# Patient Record
Sex: MALE | Race: BLACK OR AFRICAN AMERICAN | NOT HISPANIC OR LATINO | Employment: UNEMPLOYED | URBAN - METROPOLITAN AREA
[De-identification: names, ages, dates, MRNs, and addresses within clinical notes are randomized per-mention and may not be internally consistent; named-entity substitution may affect disease eponyms.]

---

## 2017-08-14 ENCOUNTER — HOSPITAL ENCOUNTER (EMERGENCY)
Facility: HOSPITAL | Age: 22
Discharge: HOME | End: 2017-08-14
Attending: EMERGENCY MEDICINE | Admitting: EMERGENCY MEDICINE
Payer: COMMERCIAL

## 2017-08-14 VITALS
HEART RATE: 52 BPM | DIASTOLIC BLOOD PRESSURE: 76 MMHG | RESPIRATION RATE: 16 BRPM | OXYGEN SATURATION: 96 % | SYSTOLIC BLOOD PRESSURE: 134 MMHG | TEMPERATURE: 98.3 F

## 2017-08-14 DIAGNOSIS — Z76.89 ENCOUNTER FOR PSYCHIATRIC ASSESSMENT: Primary | ICD-10-CM

## 2017-08-14 LAB — ETHANOL EXG-MCNC: 0 MG/DL

## 2017-08-14 PROCEDURE — 82075 ASSAY OF BREATH ETHANOL: CPT | Performed by: EMERGENCY MEDICINE

## 2017-08-14 PROCEDURE — 99284 EMERGENCY DEPT VISIT MOD MDM: CPT

## 2018-01-09 ENCOUNTER — HOSPITAL ENCOUNTER (EMERGENCY)
Facility: HOSPITAL | Age: 23
Discharge: HOME | End: 2018-01-09
Attending: EMERGENCY MEDICINE | Admitting: EMERGENCY MEDICINE
Payer: COMMERCIAL

## 2018-01-09 VITALS
HEART RATE: 92 BPM | SYSTOLIC BLOOD PRESSURE: 131 MMHG | OXYGEN SATURATION: 98 % | RESPIRATION RATE: 16 BRPM | TEMPERATURE: 97.8 F | WEIGHT: 200 LBS | DIASTOLIC BLOOD PRESSURE: 61 MMHG

## 2018-01-09 DIAGNOSIS — R11.2 NAUSEA VOMITING AND DIARRHEA: ICD-10-CM

## 2018-01-09 DIAGNOSIS — R55 VASOVAGAL SYNCOPE: Primary | ICD-10-CM

## 2018-01-09 DIAGNOSIS — S01.112A EYEBROW LACERATION, LEFT, INITIAL ENCOUNTER: ICD-10-CM

## 2018-01-09 DIAGNOSIS — E86.0 DEHYDRATION: ICD-10-CM

## 2018-01-09 DIAGNOSIS — R19.7 NAUSEA VOMITING AND DIARRHEA: ICD-10-CM

## 2018-01-09 LAB
ALBUMIN SERPL BCP-MCNC: 4 G/DL (ref 3.5–5)
ALP SERPL-CCNC: 106 U/L (ref 46–116)
ALT SERPL W P-5'-P-CCNC: 22 U/L (ref 12–78)
ANION GAP SERPL CALCULATED.3IONS-SCNC: 7 MMOL/L (ref 4–13)
AST SERPL W P-5'-P-CCNC: 13 U/L (ref 5–45)
ATRIAL RATE: 79 BPM
BASOPHILS # BLD MANUAL: 0.14 THOUSAND/UL (ref 0–0.1)
BASOPHILS NFR MAR MANUAL: 1 % (ref 0–1)
BILIRUB SERPL-MCNC: 0.5 MG/DL (ref 0.2–1)
BUN SERPL-MCNC: 11 MG/DL (ref 5–25)
CALCIUM SERPL-MCNC: 9.4 MG/DL (ref 8.3–10.1)
CHLORIDE SERPL-SCNC: 105 MMOL/L (ref 100–108)
CO2 SERPL-SCNC: 29 MMOL/L (ref 21–32)
CREAT SERPL-MCNC: 1.22 MG/DL (ref 0.6–1.3)
EOSINOPHIL # BLD MANUAL: 0.14 THOUSAND/UL (ref 0–0.4)
EOSINOPHIL NFR BLD MANUAL: 1 % (ref 0–6)
ERYTHROCYTE [DISTWIDTH] IN BLOOD BY AUTOMATED COUNT: 12.6 % (ref 11.6–15.1)
GFR SERPL CREATININE-BSD FRML MDRD: 97 ML/MIN/1.73SQ M
GLUCOSE SERPL-MCNC: 114 MG/DL (ref 65–140)
HCT VFR BLD AUTO: 45 % (ref 36.5–49.3)
HGB BLD-MCNC: 15.7 G/DL (ref 12–17)
LYMPHOCYTES # BLD AUTO: 0.28 THOUSAND/UL (ref 0.6–4.47)
LYMPHOCYTES # BLD AUTO: 2 % (ref 14–44)
MCH RBC QN AUTO: 29.7 PG (ref 26.8–34.3)
MCHC RBC AUTO-ENTMCNC: 34.9 G/DL (ref 31.4–37.4)
MCV RBC AUTO: 85 FL (ref 82–98)
MONOCYTES # BLD AUTO: 0.57 THOUSAND/UL (ref 0–1.22)
MONOCYTES NFR BLD: 4 % (ref 4–12)
NEUTROPHILS # BLD MANUAL: 13.1 THOUSAND/UL (ref 1.85–7.62)
NEUTS BAND NFR BLD MANUAL: 6 % (ref 0–8)
NEUTS SEG NFR BLD AUTO: 86 % (ref 43–75)
NRBC BLD AUTO-RTO: 0 /100 WBCS
P AXIS: 52 DEGREES
PLATELET # BLD AUTO: 199 THOUSANDS/UL (ref 149–390)
PLATELET BLD QL SMEAR: ADEQUATE
PMV BLD AUTO: 10 FL (ref 8.9–12.7)
POTASSIUM SERPL-SCNC: 4 MMOL/L (ref 3.5–5.3)
PR INTERVAL: 174 MS
PROT SERPL-MCNC: 7.7 G/DL (ref 6.4–8.2)
QRS AXIS: 91 DEGREES
QRSD INTERVAL: 90 MS
QT INTERVAL: 334 MS
QTC INTERVAL: 382 MS
RBC # BLD AUTO: 5.28 MILLION/UL (ref 3.88–5.62)
RBC MORPH BLD: NORMAL
SODIUM SERPL-SCNC: 141 MMOL/L (ref 136–145)
T WAVE AXIS: 42 DEGREES
TOTAL CELLS COUNTED SPEC: 100
VENTRICULAR RATE: 79 BPM
WBC # BLD AUTO: 14.24 THOUSAND/UL (ref 4.31–10.16)

## 2018-01-09 PROCEDURE — 85027 COMPLETE CBC AUTOMATED: CPT | Performed by: EMERGENCY MEDICINE

## 2018-01-09 PROCEDURE — 96361 HYDRATE IV INFUSION ADD-ON: CPT

## 2018-01-09 PROCEDURE — 80053 COMPREHEN METABOLIC PANEL: CPT | Performed by: EMERGENCY MEDICINE

## 2018-01-09 PROCEDURE — 96374 THER/PROPH/DIAG INJ IV PUSH: CPT

## 2018-01-09 PROCEDURE — 85007 BL SMEAR W/DIFF WBC COUNT: CPT | Performed by: EMERGENCY MEDICINE

## 2018-01-09 PROCEDURE — 93005 ELECTROCARDIOGRAM TRACING: CPT

## 2018-01-09 PROCEDURE — 99284 EMERGENCY DEPT VISIT MOD MDM: CPT

## 2018-01-09 PROCEDURE — 36415 COLL VENOUS BLD VENIPUNCTURE: CPT | Performed by: EMERGENCY MEDICINE

## 2018-01-09 RX ORDER — LIDOCAINE HYDROCHLORIDE 20 MG/ML
5 INJECTION, SOLUTION INFILTRATION; PERINEURAL ONCE
Status: COMPLETED | OUTPATIENT
Start: 2018-01-09 | End: 2018-01-09

## 2018-01-09 RX ORDER — ONDANSETRON 4 MG/1
4 TABLET, ORALLY DISINTEGRATING ORAL EVERY 6 HOURS PRN
Qty: 20 TABLET | Refills: 0 | Status: SHIPPED | OUTPATIENT
Start: 2018-01-09 | End: 2018-01-14

## 2018-01-09 RX ORDER — ONDANSETRON 2 MG/ML
4 INJECTION INTRAMUSCULAR; INTRAVENOUS ONCE
Status: COMPLETED | OUTPATIENT
Start: 2018-01-09 | End: 2018-01-09

## 2018-01-09 RX ADMIN — SODIUM CHLORIDE 1000 ML: 0.9 INJECTION, SOLUTION INTRAVENOUS at 18:25

## 2018-01-09 RX ADMIN — ONDANSETRON 4 MG: 2 INJECTION INTRAMUSCULAR; INTRAVENOUS at 17:39

## 2018-01-09 RX ADMIN — SODIUM CHLORIDE 1000 ML: 0.9 INJECTION, SOLUTION INTRAVENOUS at 17:39

## 2018-01-09 RX ADMIN — LIDOCAINE HYDROCHLORIDE 5 ML: 20 INJECTION, SOLUTION INFILTRATION; PERINEURAL at 17:39

## 2018-01-09 NOTE — ED PROVIDER NOTES
History  Chief Complaint   Patient presents with    Syncope     patient states that he was in the bathroom and "over-heated and passed out" while vomiting; patient states that he hit his head at on the floor; small laceration noted above left eye;     Head Laceration    Vomiting     50-year-old male presents for evaluation of a syncopal event that occurred earlier today while he was on the toilet  The patient states that he has had multiple episodes of vomiting and diarrhea throughout the day  He was sitting on the toilet having a bowel movement when he became hot, flushed and then fell forward passed out striking the left supraorbital region causing a laceration  At the time of examination in the emergency department, the patient denies any chest pain, pressure, shortness of breath  He states that he did not have any of these preceding the episode  Patient has no history of exercise induced syncope  There is no family history of sudden cardiac death  History provided by:  Patient  Syncope   Episode history:  Single  Most recent episode: Today  Timing:  Constant  Progression:  Resolved  Chronicity:  New  Context: bowel movement    Witnessed: no    Relieved by:  Nothing  Worsened by:  Nothing  Ineffective treatments:  None tried  Associated symptoms: malaise/fatigue, nausea and vomiting    Associated symptoms: no chest pain, no difficulty breathing, no dizziness, no fever, no headaches, no palpitations and no shortness of breath    Head Laceration   Associated symptoms: no fever    Vomiting   Associated symptoms: diarrhea    Associated symptoms: no chills, no fever and no headaches        None       Past Medical History:   Diagnosis Date    Cardiac disease     heart murmur       History reviewed  No pertinent surgical history  History reviewed  No pertinent family history  I have reviewed and agree with the history as documented      Social History   Substance Use Topics    Smoking status: Current Every Day Smoker    Smokeless tobacco: Never Used    Alcohol use No        Review of Systems   Constitutional: Positive for malaise/fatigue  Negative for chills and fever  Respiratory: Negative for shortness of breath  Cardiovascular: Positive for syncope  Negative for chest pain and palpitations  Gastrointestinal: Positive for diarrhea, nausea and vomiting  Neurological: Positive for light-headedness  Negative for dizziness and headaches  All other systems reviewed and are negative  Physical Exam  ED Triage Vitals [01/09/18 1647]   Temperature Pulse Respirations Blood Pressure SpO2   97 8 °F (36 6 °C) 85 20 112/71 100 %      Temp Source Heart Rate Source Patient Position - Orthostatic VS BP Location FiO2 (%)   Temporal Monitor Sitting Right arm --      Pain Score       --           Orthostatic Vital Signs  Vitals:    01/09/18 1647   BP: 112/71   Pulse: 85   Patient Position - Orthostatic VS: Sitting       Physical Exam   Constitutional: He is oriented to person, place, and time  He appears well-developed and well-nourished  No distress  HENT:   Head: Normocephalic  Head is with laceration  Head is without raccoon's eyes and without Godoy's sign  Right Ear: External ear normal  No hemotympanum  Left Ear: External ear normal  No hemotympanum  Nose: No nasal deformity or nasal septal hematoma  Mouth/Throat: Mucous membranes are dry  Eyes: Conjunctivae and EOM are normal  Pupils are equal, round, and reactive to light  Neck: Normal range of motion  No tracheal deviation present  Cardiovascular: Normal rate and regular rhythm  Murmur heard  Pulmonary/Chest: Effort normal and breath sounds normal  No respiratory distress  He exhibits no tenderness  Abdominal: Soft  He exhibits no distension  There is no tenderness  There is no rebound and no guarding  Musculoskeletal: Normal range of motion  He exhibits no tenderness     Neurological: He is alert and oriented to person, place, and time  He has normal reflexes  Skin: Skin is warm and dry  Psychiatric: He has a normal mood and affect  Nursing note and vitals reviewed  ED Medications  Medications   sodium chloride 0 9 % bolus 1,000 mL (1,000 mL Intravenous New Bag 1/9/18 1739)   sodium chloride 0 9 % bolus 1,000 mL (not administered)   ondansetron (ZOFRAN) injection 4 mg (4 mg Intravenous Given 1/9/18 1739)   lidocaine (XYLOCAINE) 2 % injection 5 mL (5 mL Infiltration Given 1/9/18 1739)       Diagnostic Studies  Results Reviewed     Procedure Component Value Units Date/Time    CBC and differential [[id number]]  (Abnormal) Collected:  01/09/18 1738    Lab Status:  Final result Specimen:  Blood from Arm, Left Updated:  01/09/18 1816     WBC 14 24 (H) Thousand/uL      RBC 5 28 Million/uL      Hemoglobin 15 7 g/dL      Hematocrit 45 0 %      MCV 85 fL      MCH 29 7 pg      MCHC 34 9 g/dL      RDW 12 6 %      MPV 10 0 fL      Platelets 757 Thousands/uL      nRBC 0 /100 WBCs     Narrative: This is an appended report  These results have been appended to a previously verified report  Comprehensive metabolic panel [[id number]] Collected:  01/09/18 1738    Lab Status:  Final result Specimen:  Blood from Arm, Left Updated:  01/09/18 1809     Sodium 141 mmol/L      Potassium 4 0 mmol/L      Chloride 105 mmol/L      CO2 29 mmol/L      Anion Gap 7 mmol/L      BUN 11 mg/dL      Creatinine 1 22 mg/dL      Glucose 114 mg/dL      Calcium 9 4 mg/dL      AST 13 U/L      ALT 22 U/L      Alkaline Phosphatase 106 U/L      Total Protein 7 7 g/dL      Albumin 4 0 g/dL      Total Bilirubin 0 50 mg/dL      eGFR 97 ml/min/1 73sq m     Narrative:         National Kid[employer] Disease Education Program recommendations are as follows:  GFR calculation is accurate only with a steady state creatinine  Chronic Kidney disease less than 60 ml/min/1 73 sq  meters  Kidney failure less than 15 ml/min/1 73 sq  meters                   No orders to display Procedures  ECG 12 Lead Documentation  Date/Time: 1/9/2018 5:30 PM  Performed by: Keny Davis  Authorized by: Arvin ODEN     Indications / Diagnosis:  Syncope  ECG reviewed by me, the ED Provider: yes    Patient location:  ED  Previous ECG:     Previous ECG:  Compared to current    Comparison ECG info:  Sinus arrhythmia no longer present when compared to previous EKG    Similarity:  No change  Interpretation:     Interpretation: normal    Rate:     ECG rate:  79    ECG rate assessment: normal    Rhythm:     Rhythm: sinus rhythm    Ectopy:     Ectopy: none    QRS:     QRS axis:  Normal    QRS intervals:  Normal  Conduction:     Conduction: normal    ST segments:     ST segments:  Normal  T waves:     T waves: normal    Comments:      QTC normal    Lac Repair  Date/Time: 1/9/2018 6:10 PM  Performed by: Keny Davis  Authorized by: Keny Davis   Consent: Verbal consent obtained  Risks and benefits: risks, benefits and alternatives were discussed  Consent given by: patient  Patient understanding: patient states understanding of the procedure being performed  Body area: head/neck  Location details: left eyelid  Laceration length: 2 5 cm  Tendon involvement: none  Nerve involvement: none  Vascular damage: no  Anesthesia: local infiltration    Anesthesia:  Local Anesthetic: lidocaine 2% without epinephrine  Anesthetic total: 6 mL      Procedure Details:  Preparation: Patient was prepped and draped in the usual sterile fashion    Irrigation solution: saline  Irrigation method: syringe  Amount of cleaning: standard  Skin closure: 5-0 nylon (5)  Subcutaneous closure: 5-0 Vicryl (2)  Technique: simple  Approximation: close  Patient tolerance: Patient tolerated the procedure well with no immediate complications             Phone Contacts  ED Phone Contact    ED Course  ED Course                                MDM  Number of Diagnoses or Management Options  Dehydration: new and requires workup  Eyebrow laceration, left, initial encounter: new and requires workup  Nausea vomiting and diarrhea: new and requires workup  Vasovagal syncope: new and requires workup     Amount and/or Complexity of Data Reviewed  Clinical lab tests: ordered and reviewed  Tests in the medicine section of CPT®: reviewed and ordered  Review and summarize past medical records: yes  Independent visualization of images, tracings, or specimens: yes      CritCare Time    Disposition  Final diagnoses:   Vasovagal syncope   Nausea vomiting and diarrhea   Eyebrow laceration, left, initial encounter   Dehydration     Time reflects when diagnosis was documented in both MDM as applicable and the Disposition within this note     Time User Action Codes Description Comment    1/9/2018  5:22 PM Baird Posada Add [R55] Vasovagal syncope     1/9/2018  5:22 PM Deisy Posada Add [R11 2,  R19 7] Nausea vomiting and diarrhea     1/9/2018  5:22 PM Deisy Posada Modify [R11 2,  R19 7] Nausea vomiting and diarrhea     1/9/2018  5:23 PM Ginny ODEN Add [H11 087D] Eyebrow laceration, left, initial encounter     1/9/2018  6:12 PM Baird Posada Add [E86 0] Dehydration       ED Disposition     None      Follow-up Information     Follow up With Specialties Details Why Contact Info    Jean Suarez MD Pediatrics In 5 days For suture removal [place] [id number]  [address] [facility]  [phone]          Patient's Medications   Discharge Prescriptions    ONDANSETRON (ZOFRAN-ODT) 4 MG DISINTEGRATING TABLET    Take 1 tablet by mouth every 6 (six) hours as needed for nausea or vomiting       Start Date: 1/9/2018  End Date: --       Order Dose: 4 mg       Quantity: 20 tablet    Refills: 0     No discharge procedures on file      ED Provider  Electronically Signed by           Elizabeth Rocha DO  01/09/18 1773

## 2018-01-09 NOTE — DISCHARGE INSTRUCTIONS
Acute Diarrhea   WHAT YOU NEED TO KNOW:   Acute diarrhea starts quickly and lasts a short time, usually 1 to 3 days  It can last up to 2 weeks  You may not be able to control your diarrhea  Acute diarrhea usually stops on its own  DISCHARGE INSTRUCTIONS:   Return to the emergency department if:   · You feel confused  · Your heartbeat is faster than normal      · Your eyes look deeply sunken, or you have no tears when you cry  · You urinate less than usual, or your urine is dark yellow  · You have blood or mucus in your stools  · You have severe abdominal pain  · You are unable to drink any liquids  Contact your healthcare provider if:   · Your symptoms do not get better with treatment  · You have a fever higher than 101 3°F (38 5°C)  · You have trouble eating and drinking because you are vomiting  · You are thirsty or have a dry mouth  · Your diarrhea does not get better in 7 days  · You have questions or concerns about your condition or care  Follow up with your healthcare provider as directed:  Write down your questions so you remember to ask them during your visits  Medicines:  · Diarrhea medicine  is an over-the-counter medicine that helps slow or stop your diarrhea  If you take other medicines, talk to your healthcare provider before you take diarrhea medicine  · Antibiotics  may be given to help treat an infection caused by bacteria  · Antiparasitics  may be given to treat an infection caused by parasites  · Take your medicine as directed  Contact your healthcare provider if you think your medicine is not helping or if you have side effects  Tell him of her if you are allergic to any medicine  Keep a list of the medicines, vitamins, and herbs you take  Include the amounts, and when and why you take them  Bring the list or the pill bottles to follow-up visits  Carry your medicine list with you in case of an emergency    Self-care:   · Drink liquids as directed  Liquids will help prevent dehydration caused by diarrhea  Ask your healthcare provider how much liquid to drink each day and which liquids are best for you  You may need to drink an oral rehydration solution (ORS)  An ORS has the right amounts of water, salts, and sugar you need to replace body fluids  You can buy an ORS at most grocery stores and pharmacies  · Eat foods that are easy to digest   Examples include rice, lentils, cereal, bananas, potatoes, and bread  It also includes some fruits (bananas, melon), well-cooked vegetables, and lean meats  Avoid foods high in fiber, fat, and sugar  Also avoid caffeine, alcohol, dairy, and red meat until your diarrhea is gone  Prevent acute diarrhea:   · Wash your hands often  Use soap and water  Wash your hands before you eat or prepare food  Also wash your hands after you use the bathroom  Use an alcohol-based hand gel when soap and water are not available  · Keep bathroom surfaces clean  This helps prevent the spread of germs that cause acute diarrhea  · Wash fruits and vegetables well before you eat them  This can help remove germs that cause diarrhea  If possible, remove the skin from fruits and vegetables, or cook them well before you eat them  · Cook meat as directed  ¨ Cook ground meat  to 160°F      ¨ Cook ground poultry, whole poultry, or cuts of poultry  to at least 165°F  Remove the meat from heat  Let it stand for 3 minutes before you eat it  ¨ Cook whole cuts of meat other than poultry  to at least 145°F  Remove the meat from heat  Let it stand for 3 minutes before you eat it  · Wash dishes that have touched raw meat with hot water and soap  This includes cutting boards, utensils, dishes, and serving containers  · Place raw or cooked meat in the refrigerator as soon as possible  Bacteria can grow in meat that is left at room temperature too long  · Do not eat raw or undercooked oysters, clams, or mussels  These foods may be contaminated and cause infection  · Drink filtered or treated water only when you travel  Do not put ice in your drinks  Drink bottled water whenever possible  © 2017 [address]  Information is for End User's use only and may not be sold, redistributed or otherwise used for commercial purposes  All illustrations and images included in CareNotes® are the copyrighted property of [employer]  or [employer]  The above information is an  only  It is not intended as medical advice for individual conditions or treatments  Talk to your doctor, nurse or pharmacist before following any medical regimen to see if it is safe and effective for you  Acute Nausea and Vomiting   WHAT YOU NEED TO KNOW:   Acute nausea and vomiting start suddenly, worsen quickly, and last a short time  DISCHARGE INSTRUCTIONS:   Return to the emergency department if:   · You see blood in your vomit or your bowel movements  · You have sudden, severe pain in your chest and upper abdomen after hard vomiting or retching  · You have swelling in your neck and chest      · You are dizzy, cold, and thirsty and your eyes and mouth are dry  · You are urinating very little or not at all  · You have muscle weakness, leg cramps, and trouble breathing  · Your heart is beating much faster than normal      · You continue to vomit for more than 48 hours  Contact your healthcare provider if:   · You have frequent dry heaves (vomiting but nothing comes out)  · Your nausea and vomiting does not get better or go away after you use medicine  · You have questions or concerns about your condition or treatment  Medicines: You may need any of the following:  · Medicines  may be given to calm your stomach and stop your vomiting  You may also need medicines to help you feel more relaxed or to stop nausea and vomiting caused by motion sickness      · Gastrointestinal stimulants  are used to help empty your stomach and bowels  This may help decrease nausea and vomiting  · Take your medicine as directed  Contact your healthcare provider if you think your medicine is not helping or if you have side effects  Tell him or her if you are allergic to any medicine  Keep a list of the medicines, vitamins, and herbs you take  Include the amounts, and when and why you take them  Bring the list or the pill bottles to follow-up visits  Carry your medicine list with you in case of an emergency  Prevent or manage acute nausea and vomiting:   · Do not drink alcohol  Alcohol may upset or irritate your stomach  Too much alcohol can also cause acute nausea and vomiting  · Control stress  Headaches due to stress may cause nausea and vomiting  Find ways to relax and manage your stress  Get more rest and sleep  · Drink more liquids as directed  Vomiting can lead to dehydration  It is important to drink more liquids to help replace lost body fluids  Ask your healthcare provider how much liquid to drink each day and which liquids are best for you  Your provider may recommend that you drink an oral rehydration solution (ORS)  ORS contains water, salts, and sugar that are needed to replace the lost body fluids  Ask what kind of ORS to use, how much to drink, and where to get it  · Eat smaller meals, more often  Eat small amounts of food every 2 to 3 hours, even if you are not hungry  Food in your stomach may decrease your nausea  · Talk to your healthcare provider before you take over-the-counter (OTC) medicines  These medicines can cause serious problems if you use certain other medicines, or you have a medical condition  You may have problems if you use too much or use them for longer than the label says  Follow directions on the label carefully  Follow up with your healthcare provider as directed:  Write down your questions so you remember to ask them during your follow-up visits    © 2017 [facility] [address] is for End User's use only and may not be sold, redistributed or otherwise used for commercial purposes  All illustrations and images included in CareNotes® are the copyrighted property of [employer]  or [employer] Alber[employer] Ernandez  The above information is an  only  It is not intended as medical advice for individual conditions or treatments  Talk to your doctor, nurse or pharmacist before following any medical regimen to see if it is safe and effective for you  Facial Laceration   WHAT YOU NEED TO KNOW:   A facial laceration is a tear or cut in the skin caused by blunt or shearing forces, or sharp objects  Facial lacerations may be closed within 24 hours of injury  DISCHARGE INSTRUCTIONS:   Return to the emergency department if:   · You have a fever and the wound is painful, warm, or swollen  The wound area may be red, or fluid may come out of it  · You have heavy bleeding or bleeding that does not stop after 10 minutes of holding firm, direct pressure over the wound  Contact your healthcare provider if:   · Your wound reopens or your tape comes off  · Your wound is very painful  · Your wound is not healing, or you think there is an object in the wound  · The skin around your wound stays numb  · You have questions or concerns about your condition or care  Medicines:   · Antibiotics  may be given to prevent an infection if your wound was deep and had to be cleaned out  · Take your medicine as directed  Contact your healthcare provider if you think your medicine is not helping or if you have side effects  Tell him of her if you are allergic to any medicine  Keep a list of the medicines, vitamins, and herbs you take  Include the amounts, and when and why you take them  Bring the list or the pill bottles to follow-up visits  Carry your medicine list with you in case of an emergency    Care for your wound:  Care for your wound as directed to prevent infection and help it heal  Wash your hands with soap and warm water before and after you care for your wound  You may need to keep the wound dry for the first 24 to 48 hours  When your healthcare provider says it is okay, wash around your wound with soap and water, or as directed  Gently pat the area dry  Do not use alcohol or hydrogen peroxide to clean your wound unless you are directed to  · Do not take aspirin or NSAIDs for 24 hours after being injured  Aspirin and NSAIDs can increase blood flow  Your laceration may continue to bleed  · Do not take hot showers, eat or drink hot foods and liquids for 48 hours after being injured  Also, do not use a heating pad near your laceration  The heat can cause swelling in and around your laceration  · If your wound was covered with a bandage,  leave your bandage on as long as directed  Bandages keep your wound clean and protected  They can also prevent swelling  Ask when and how to change your bandage  Be careful not to apply the bandage or tape too tightly  This could cut off blood flow and cause more injury  · If your wound was closed with stitches,  keep your wound clean  Your healthcare provider may recommend that you apply antibiotic ointment after you clean your wound  · If your wound was closed with wound tape or medical strips,  keep the area clean and dry  The strips will usually fall off on their own after several days  · If your wound was closed with tissue glue,  do not use any ointments or lotions on the area  You may shower, but do not swim or soak in a bathtub  Gently pat the area dry after you take a shower  Do not pick at or scrub the glue area  Decrease scarring: The skin in the area of your wound may turn a different color if it is exposed to direct sunlight  After your wound is healed, use sunscreen over the area when you are out in the sun  You should do this for at least 6 months to 1 year after your injury   Some wounds scar less if they are covered while they heal   Follow up with your healthcare provider as directed: You may need to follow up with your healthcare provider in 24 to 48 hours to have your wound checked for infection  You may need to return in 3 to 5 days if you have stitches that need to be removed  Write down your questions so you remember to ask them during your visits  © 2017 [address] Information is for End User's use only and may not be sold, redistributed or otherwise used for commercial purposes  All illustrations and images included in CareNotes® are the copyrighted property of [employer]  or [employer] Alber[employer] Ernandez  The above information is an  only  It is not intended as medical advice for individual conditions or treatments  Talk to your doctor, nurse or pharmacist before following any medical regimen to see if it is safe and effective for you  Syncope   WHAT YOU NEED TO KNOW:   Syncope is also called fainting or passing out  Syncope is a sudden, temporary loss of consciousness, followed by a fall from a standing or sitting position  Syncope ranges from not serious to a sign of a more serious condition that needs to be treated  You can control some health conditions that cause syncope  Your healthcare providers can help you create a plan to manage syncope and prevent episodes  DISCHARGE INSTRUCTIONS:   Seek care immediately if:   · You are bleeding because you hit your head when you fainted  · You suddenly have double vision, difficulty speaking, numbness, and cannot move your arms or legs  · You have chest pain and trouble breathing  · You vomit blood or material that looks like coffee grounds  · You see blood in your bowel movement  Contact your healthcare provider if:   · You have new or worsening symptoms  · You have another syncope episode  · You have a headache, fast heartbeat, or feel too dizzy to stand up      · You have questions or concerns about your condition or care  Follow up with your healthcare provider as directed:  Write down your questions so you remember to ask them during your visits  Manage syncope:   · Keep a record of your syncope episodes  Include your symptoms and your activity before and after the episode  The record can help your healthcare provider find the cause of your syncope and help you manage episodes  · Sit or lie down when needed  This includes when you feel dizzy, your throat is getting tight, and your vision changes  Raise your legs above the level of your heart  · Take slow, deep breaths if you start to breathe faster with anxiety or fear  This can help decrease dizziness and the feeling that you might faint  · Check your blood pressure often  This is important if you take medicine to lower your blood pressure  Check your blood pressure when you are lying down and when you are standing  Ask how often to check during the day  Keep a record of your blood pressure numbers  Your healthcare provider may use the record to help plan your treatment  Prevent a syncope episode:   · Move slowly and let yourself get used to one position before you move to another position  This is very important when you change from a lying or sitting position to a standing position  Take some deep breaths before you stand up from a lying position  Stand up slowly  Sudden movements may cause a fainting spell  Sit on the side of the bed or couch for a few minutes before you stand up  If you are on bedrest, try to be upright for about 2 hours each day, or as directed  Do not lock your legs if you are standing for a long period of time  Move your legs and bend your knees to keep blood flowing  · Follow your healthcare provider's recommendations  Your provider may  recommend that you drink more liquids to prevent dehydration   You may also need to have more salt to keep your blood pressure from dropping too low and causing syncope  Your provider will tell you how much liquid and sodium to have each day  · Watch for signs of low blood sugar  These include hunger, nervousness, sweating, and fast or fluttery heartbeats  Talk with your healthcare provider about ways to keep your blood sugar level steady  · Do not strain if you are constipated  You may faint if you strain to have a bowel movement  Walking is the best way to get your bowels moving  Eat foods high in fiber to make it easier to have a bowel movement  Good examples are high-fiber cereals, beans, vegetables, and whole-grain breads  Prune juice may help make bowel movements softer  · Be careful in hot weather  Heat can cause a syncope episode  Limit activity done outside on hot days  Physical activity in hot weather can lead to dehydration  This can cause an episode  © 2017 [address]  Information is for End User's use only and may not be sold, redistributed or otherwise used for commercial purposes  All illustrations and images included in CareNotes® are the copyrighted property of [employer]  or [employer] Alber[employer] Ernandez  The above information is an  only  It is not intended as medical advice for individual conditions or treatments  Talk to your doctor, nurse or pharmacist before following any medical regimen to see if it is safe and effective for you

## 2018-01-09 NOTE — ED NOTES
Patient extremely angry that he cannot have water at this time; patient refused to open eyes during triage; explained to patient why he cannot have water at this time; patient answered questions appropriately but states that he just doesn't feel right and his head really hurts      Nandini Vyas RN  01/09/18 Daniel Duval RN  01/09/18 [id number]

## 2018-01-10 NOTE — ED NOTES
Patient awake and sitting up in bed playing on cell phone  Patient states, "I'm feeling better " Per Dr Kwon [facility], patient to remain in department until 2nd liter of fluid is complete        Leopoldo Blaze, RN  01/09/18 1920

## 2018-01-10 NOTE — ED NOTES
Patient lying in stretcher with eyes clothes, not speaking to answer RN's questions  Patient nodding his head yes or no  Patient uncooperative with RN's assessment        Timur Ricardo RN  01/09/18 Walter Love

## 2018-01-14 ENCOUNTER — HOSPITAL ENCOUNTER (EMERGENCY)
Facility: HOSPITAL | Age: 23
Discharge: HOME | End: 2018-01-14
Admitting: EMERGENCY MEDICINE
Payer: COMMERCIAL

## 2018-01-14 VITALS
TEMPERATURE: 98.4 F | DIASTOLIC BLOOD PRESSURE: 64 MMHG | SYSTOLIC BLOOD PRESSURE: 130 MMHG | RESPIRATION RATE: 16 BRPM | HEART RATE: 54 BPM | OXYGEN SATURATION: 100 %

## 2018-01-14 DIAGNOSIS — Z48.02 VISIT FOR SUTURE REMOVAL: Primary | ICD-10-CM

## 2018-01-14 PROCEDURE — 99281 EMR DPT VST MAYX REQ PHY/QHP: CPT

## 2018-01-14 NOTE — ED PROVIDER NOTES
History  Chief Complaint   Patient presents with    Suture / Staple Removal     Pt has sutures in left eye brow  Attempted to go to PCP but states they are closed  Patient is a 71-year-old male who presents for evaluation of suture removal   He states 5 days ago on 01/09/2018 he had 5 sutures placed to his left eyebrow  He states he passed out and hit his head and that is why he needed sutures  He states he was told to have them removed in 5 days  He tried to go to family doctor but they were closed  He denies any fever, chills, nausea vomiting diarrhea, redness, swelling, warmth, pain, bleeding or drainage  He is up-to-date on tetanus  None       Past Medical History:   Diagnosis Date    Cardiac disease     heart murmur       History reviewed  No pertinent surgical history  History reviewed  No pertinent family history  I have reviewed and agree with the history as documented  Social History   Substance Use Topics    Smoking status: Current Every Day Smoker    Smokeless tobacco: Never Used    Alcohol use No        Review of Systems   Constitutional: Negative for chills and fever  Gastrointestinal: Negative for diarrhea, nausea and vomiting  Skin: Negative for color change and rash  Left eyebrow laceration with sutures in place    All other systems reviewed and are negative  Physical Exam  ED Triage Vitals [01/14/18 1153]   Temperature Pulse Respirations Blood Pressure SpO2   98 4 °F (36 9 °C) (!) 54 16 130/64 100 %      Temp Source Heart Rate Source Patient Position - Orthostatic VS BP Location FiO2 (%)   Temporal Monitor Sitting Right arm --      Pain Score       No Pain           Orthostatic Vital Signs  Vitals:    01/14/18 1153   BP: 130/64   Pulse: (!) 54   Patient Position - Orthostatic VS: Sitting       Physical Exam   Constitutional: He is oriented to person, place, and time  He appears well-developed and well-nourished  No distress     HENT:   Head: Normocephalic  Right Ear: External ear normal    Left Ear: External ear normal    Nose: Nose normal    Eyes: EOM are normal    Neck: Normal range of motion  Cardiovascular: Regular rhythm and normal heart sounds  Bradycardia present  Exam reveals no gallop and no friction rub  No murmur heard  Pulmonary/Chest: Effort normal and breath sounds normal  No respiratory distress  He has no wheezes  He has no rales  Musculoskeletal: Normal range of motion  Neurological: He is alert and oriented to person, place, and time  Skin: Skin is warm and dry  No rash noted  He is not diaphoretic  Left eyebrow with prior laceration with 5 sutures in place  Laceration is well healed  No sign of infection, bleeding or drainage  No sign of wound separation  Psychiatric: He has a normal mood and affect  His behavior is normal  Thought content normal    Nursing note and vitals reviewed  ED Medications  Medications - No data to display    Diagnostic Studies  Results Reviewed     None                 No orders to display              Procedures  Suture Removal  Date/Time: 1/14/2018 12:45 PM  Performed by: Minnie Waite by: Jose Mcdonald     Patient location:  ED  Other Assisting Provider: No    Consent:     Consent obtained:  Verbal    Consent given by:  Patient    Risks discussed:  Bleeding, pain and wound separation  Universal protocol:     Procedure explained and questions answered to patient or proxy's satisfaction: yes      Patient identity confirmed:  Verbally with patient  Location:     Laterality:  Left    Location: eyebrow  Procedure details: Tools used:  Suture removal kit, scissors and scalpel    Wound appearance:  No sign(s) of infection, good wound healing and clean    Number of sutures removed:  5  Post-procedure details:     Post-removal:  No dressing applied    Patient tolerance of procedure:   Tolerated well, no immediate complications           Phone Contacts  ED Phone Contact    ED Course  ED Course                                MDM  CritCare Time    Disposition  Final diagnoses:   Visit for suture removal     Time reflects when diagnosis was documented in both MDM as applicable and the Disposition within this note     Time User Action Codes Description Comment    1/14/2018 12:51 PM Alissa Medrano Add [Z48 02] Visit for suture removal       ED Disposition     ED Disposition Condition Comment    Discharge  Tarry Distance discharge to home/self care  Condition at discharge: Good        Follow-up Information     Follow up With Specialties Details Why Contact Info Additional Information    Eran Galdamez MD Pediatrics Schedule an appointment as soon as possible for a visit  [address] [facility]  [address] [facility] Emergency Department Emergency Medicine  If symptoms worsen or new symptoms arise as discussed  [address] [facility]  [phone] [place] ED, [address] [place] [address], [id number]        There are no discharge medications for this patient  No discharge procedures on file      ED Provider  Electronically Signed by           Maia Hatch PA-C  01/14/18 6021

## 2018-01-14 NOTE — DISCHARGE INSTRUCTIONS
Stitches Removal   AMBULATORY CARE:   Stitches  may need to be removed in 3 to 14 days depending on the location of your wound  Your healthcare provider will use sterile forceps or tweezers to  the knot of each stitch  He will cut the stitch with scissors and pull the stitch out  You may feel a slight tug as the stitch comes out  He may place small steristrips across your wound after the stitches have been removed  These pieces of tape will peel and fall of on their own  Do not pull them off  Seek care immediately if:   · Your wound splits open  · You suddenly cannot move your injured joint  · You have sudden numbness around your wound  · You see red streaks coming from your wound  Contact your healthcare provider if:   · You have a fever and chills  · Your wound is red, warm, swollen, or leaking pus  · There is a bad smell coming from your wound  · You have increased pain in the wound area  · You have questions or concerns about your condition or care  Care for your wound:   · Clean your wound as directed  Carefully wash your wound with soap and water  Pat the area dry with a clean towel  · Protect your wound  Your wound can swell, bleed, or split open if it is stretched or bumped  You may need to wear a bandage that supports your wound until it is completely healed  · Minimize your scar  Use sunblock if your wound is exposed to the sun  Apply it every day after the stitches are removed  This will help prevent skin discoloration  Follow up with your healthcare provider as directed: You may need to return in 3 to 5 days if the stitches are on your face  Stitches on your scalp need to be removed after 7 to 14 days  Stitches over joints may remain in place up to 14 days  Write down your questions so you remember to ask them during your visits     © 2017 [address] [employer] Information is for End User's use only and may not be sold, redistributed or otherwise used for commercial purposes  All illustrations and images included in CareNotes® are the copyrighted property of [employer]  or Carlos Alberto Ernandez  The above information is an  only  It is not intended as medical advice for individual conditions or treatments  Talk to your doctor, nurse or pharmacist before following any medical regimen to see if it is safe and effective for you

## 2018-01-14 NOTE — ED NOTES
Pt ambulatory out of room asking, "How long is this going to be? Can I just leave and come back?"  Informed pt he is able to leave but would have to resign in and that he is next to be seen  Pt ambulatory back inside room        Windy Frazier RN  01/14/18 5640

## 2019-10-06 ENCOUNTER — HOSPITAL ENCOUNTER (EMERGENCY)
Facility: HOSPITAL | Age: 24
Discharge: HOME | End: 2019-10-06
Attending: EMERGENCY MEDICINE | Admitting: EMERGENCY MEDICINE
Payer: COMMERCIAL

## 2019-10-06 ENCOUNTER — APPOINTMENT (EMERGENCY)
Dept: ULTRASOUND IMAGING | Facility: HOSPITAL | Age: 24
End: 2019-10-06
Payer: COMMERCIAL

## 2019-10-06 VITALS
DIASTOLIC BLOOD PRESSURE: 88 MMHG | SYSTOLIC BLOOD PRESSURE: 151 MMHG | OXYGEN SATURATION: 99 % | HEART RATE: 45 BPM | RESPIRATION RATE: 16 BRPM | WEIGHT: 199.3 LBS | TEMPERATURE: 97.7 F

## 2019-10-06 DIAGNOSIS — N45.1 EPIDIDYMITIS: Primary | ICD-10-CM

## 2019-10-06 LAB
BACTERIA UR QL AUTO: ABNORMAL /HPF
BILIRUB UR QL STRIP: NEGATIVE
CLARITY UR: CLEAR
COLOR UR: YELLOW
COLOR, POC: YELLOW
GLUCOSE UR STRIP-MCNC: NEGATIVE MG/DL
HGB UR QL STRIP.AUTO: ABNORMAL
KETONES UR STRIP-MCNC: NEGATIVE MG/DL
LEUKOCYTE ESTERASE UR QL STRIP: ABNORMAL
NITRITE UR QL STRIP: NEGATIVE
NON-SQ EPI CELLS URNS QL MICRO: ABNORMAL /HPF
PH UR STRIP.AUTO: 6.5 [PH] (ref 4.5–8)
PROT UR STRIP-MCNC: ABNORMAL MG/DL
RBC #/AREA URNS AUTO: ABNORMAL /HPF
SP GR UR STRIP.AUTO: 1.02 (ref 1–1.03)
UROBILINOGEN UR QL STRIP.AUTO: 0.2 E.U./DL
WBC #/AREA URNS AUTO: ABNORMAL /HPF

## 2019-10-06 PROCEDURE — 87086 URINE CULTURE/COLONY COUNT: CPT

## 2019-10-06 PROCEDURE — 76870 US EXAM SCROTUM: CPT

## 2019-10-06 PROCEDURE — 96372 THER/PROPH/DIAG INJ SC/IM: CPT

## 2019-10-06 PROCEDURE — 99284 EMERGENCY DEPT VISIT MOD MDM: CPT | Performed by: EMERGENCY MEDICINE

## 2019-10-06 PROCEDURE — 81001 URINALYSIS AUTO W/SCOPE: CPT

## 2019-10-06 PROCEDURE — 87491 CHLMYD TRACH DNA AMP PROBE: CPT | Performed by: EMERGENCY MEDICINE

## 2019-10-06 PROCEDURE — 87591 N.GONORRHOEAE DNA AMP PROB: CPT | Performed by: EMERGENCY MEDICINE

## 2019-10-06 PROCEDURE — 99284 EMERGENCY DEPT VISIT MOD MDM: CPT

## 2019-10-06 RX ORDER — DOXYCYCLINE HYCLATE 100 MG/1
100 CAPSULE ORAL ONCE
Status: COMPLETED | OUTPATIENT
Start: 2019-10-06 | End: 2019-10-06

## 2019-10-06 RX ORDER — NAPROXEN 250 MG/1
500 TABLET ORAL ONCE
Status: DISCONTINUED | OUTPATIENT
Start: 2019-10-06 | End: 2019-10-06 | Stop reason: HOSPADM

## 2019-10-06 RX ORDER — DOXYCYCLINE HYCLATE 100 MG/1
100 CAPSULE ORAL 2 TIMES DAILY
Qty: 28 CAPSULE | Refills: 0 | Status: SHIPPED | OUTPATIENT
Start: 2019-10-06 | End: 2019-10-20

## 2019-10-06 RX ADMIN — CEFTRIAXONE SODIUM 250 MG: 250 INJECTION, POWDER, FOR SOLUTION INTRAMUSCULAR; INTRAVENOUS at 14:05

## 2019-10-06 RX ADMIN — DOXYCYCLINE HYCLATE 100 MG: 100 CAPSULE ORAL at 14:05

## 2019-10-06 NOTE — ED PROVIDER NOTES
History  Chief Complaint   Patient presents with    Testicle Swelling     right side testicle swelling , felt a lump, noticed this yesterday  History provided by:  Patient  Testicle Pain   Location:  T testicle  Quality:  Pain, masss  Severity:  Moderate  Onset quality:  Gradual  Duration:  2 days  Timing:  Constant  Progression:  Worsening  Chronicity:  New  Context:  Pt felt discomfort and noticed a lump  Relieved by:  Nothing  Worsened by:  Touching  Associated symptoms: no abdominal pain, no chest pain, no congestion, no diarrhea, no ear pain, no fatigue, no fever, no myalgias, no nausea, no rash, no rhinorrhea, no shortness of breath, no sore throat, no vomiting and no wheezing        None       Past Medical History:   Diagnosis Date    Cardiac disease     heart murmur       History reviewed  No pertinent surgical history  History reviewed  No pertinent family history  I have reviewed and agree with the history as documented  Social History     Tobacco Use    Smoking status: Current Every Day Smoker    Smokeless tobacco: Never Used   Substance Use Topics    Alcohol use: Yes     Comment: socially    Drug use: Yes     Types: Marijuana        Review of Systems   Constitutional: Negative for activity change, appetite change, fatigue and fever  HENT: Negative for congestion, dental problem, ear pain, rhinorrhea and sore throat  Eyes: Negative for pain and redness  Respiratory: Negative for chest tightness, shortness of breath and wheezing  Cardiovascular: Negative for chest pain and palpitations  Gastrointestinal: Negative for abdominal pain, blood in stool, constipation, diarrhea, nausea and vomiting  Endocrine: Negative for cold intolerance and heat intolerance  Genitourinary: Positive for scrotal swelling and testicular pain  Negative for discharge, dysuria, frequency, hematuria, penile pain and penile swelling  Musculoskeletal: Negative for arthralgias and myalgias     Skin: Negative for color change, pallor and rash  Neurological: Negative for weakness and numbness  Hematological: Does not bruise/bleed easily  Psychiatric/Behavioral: Negative for agitation, hallucinations and suicidal ideas  Physical Exam  Physical Exam   Constitutional: He is oriented to person, place, and time  He appears well-developed and well-nourished  HENT:   Mouth/Throat: No oropharyngeal exudate  TMs normal bilaterally no pharyngeal erythema no rhinorrhea nontender palpation of sinuses, normal looking turbinates   Eyes: Conjunctivae and EOM are normal    Neck: Normal range of motion  Neck supple  No meningeal signs   Cardiovascular: Normal rate, regular rhythm, normal heart sounds and intact distal pulses  Pulmonary/Chest: Effort normal and breath sounds normal  No respiratory distress  He has no wheezes  He has no rales  He exhibits no tenderness  Abdominal: Soft  Bowel sounds are normal  He exhibits no distension and no mass  There is no tenderness  No hernia  Hernia confirmed negative in the right inguinal area and confirmed negative in the left inguinal area  No cvat   Genitourinary: Penis normal  Cremasteric reflex is present  Right testis shows tenderness  Right testis shows no mass and no swelling  Right testis is descended  Left testis shows no mass, no swelling and no tenderness  Left testis is descended  Musculoskeletal: Normal range of motion  He exhibits no edema  Lymphadenopathy:     He has no cervical adenopathy  No inguinal adenopathy noted on the right or left side  Neurological: He is alert and oriented to person, place, and time  No cranial nerve deficit  Skin: No rash noted  No erythema  No edema   Psychiatric: He has a normal mood and affect  His behavior is normal    Nursing note and vitals reviewed        Vital Signs  ED Triage Vitals   Temperature Pulse Respirations Blood Pressure SpO2   10/06/19 1226 10/06/19 1227 10/06/19 1227 10/06/19 1227 10/06/19 1227   97 7 °F (36 5 °C) (!) 45 16 151/88 99 %      Temp Source Heart Rate Source Patient Position - Orthostatic VS BP Location FiO2 (%)   10/06/19 1226 10/06/19 1227 10/06/19 1227 10/06/19 1227 --   Temporal Monitor Sitting Right arm       Pain Score       10/06/19 1227       6           Vitals:    10/06/19 1227   BP: 151/88   Pulse: (!) 45   Patient Position - Orthostatic VS: Sitting         Visual Acuity      ED Medications  Medications   naproxen (NAPROSYN) tablet 500 mg (0 mg Oral Hold 10/6/19 1334)   cefTRIAXone (ROCEPHIN) 250 mg in sterile water IM only syringe (has no administration in time range)   doxycycline hyclate (VIBRAMYCIN) capsule 100 mg (has no administration in time range)       Diagnostic Studies  Results Reviewed     Procedure Component Value Units Date/Time    Urine Microscopic [[id number]]  (Abnormal) Collected:  10/06/19 1311    Lab Status:  Final result Specimen:  Urine, Clean Catch Updated:  10/06/19 1337     RBC, UA 4-10 /hpf      WBC, UA Innumerable /hpf      Epithelial Cells Occasional /hpf      Bacteria, UA Occasional /hpf     Urine culture [[id number]] Collected:  10/06/19 1311    Lab Status: In process Specimen:  Urine, Clean Catch Updated:  10/06/19 1337    Chlamydia/GC amplified DNA by PCR [[id number]] Collected:  10/06/19 1311    Lab Status:   In process Specimen:  Urine, Other Updated:  10/06/19 1324    POCT urinalysis dipstick [[id number]]  (Abnormal) Resulted:  10/06/19 1320    Lab Status:  Final result Specimen:  Urine Updated:  10/06/19 1321     Color, UA yellow    ED Urine Macroscopic [[id number]]  (Abnormal) Collected:  10/06/19 1311    Lab Status:  Final result Specimen:  Urine Updated:  10/06/19 1312     Color, UA Yellow     Clarity, UA Clear     pH, UA 6 5     Leukocytes, UA Moderate     Nitrite, UA Negative     Protein, UA 30 (1+) mg/dl      Glucose, UA Negative mg/dl      Ketones, UA Negative mg/dl      Urobilinogen, UA 0 2 E U /dl      Bilirubin, UA Negative     Blood, UA Small Specific [place], UA 1 025    Narrative:       CLINITEK RESULT                 US scrotum and testicles   Final Result by Derrick George MD (10/06 1342)       Diffusely enlarged and hyperemic right epididymis, particularly toward the tail, consistent with epididymitis  There is no evidence of testicular torsion  Workstation performed: [id number]                    Procedures  Procedures       ED Course  ED Course as of Oct 06 1356   Jerfabricee Limb Oct 06, 2019   1354 Ultrasound reviewed  Consistent with epididymitis  Patient will be treated, instructed on safe sex, partners to be tested, outpatient follow-up  MDM  Number of Diagnoses or Management Options  Diagnosis management comments: R scrotal pain/mass-will do u/s to r/o torsion, urine dip, gc/chlamydia        Disposition  Final diagnoses:   Epididymitis     Time reflects when diagnosis was documented in both MDM as applicable and the Disposition within this note     Time User Action Codes Description Comment    10/6/2019  1:55 PM Ricky Prajapati Add [N45 1] Epididymitis       ED Disposition     ED Disposition Condition Date/Time Comment    Discharge Stable Sun Oct 6, 2019  1:55 PM Rimma Langston discharge to home/self care              Follow-up Information     Follow up With Specialties Details Why Contact Info Additional [address] [facility] Schedule an appointment as soon as possible for a visit in 2 days  [place] [address]  707  [facility], [address] [place] [address], [place], [address] [phone]          Patient's Medications   Discharge Prescriptions    DOXYCYCLINE HYCLATE (VIBRAMYCIN) 100 MG CAPSULE    Take 1 capsule (100 mg total) by mouth 2 (two) times a day for 14 days       Start Date: 10/6/2019 End Date: 10/20/2019       Order Dose: 100 mg       Quantity: 28 capsule Refills: 0     No discharge procedures on file      ED Provider  Electronically Signed by           Cinda Blunt MD  10/06/19 9411

## 2019-10-07 LAB — BACTERIA UR CULT: NORMAL

## 2019-10-09 LAB
C TRACH DNA SPEC QL NAA+PROBE: POSITIVE
N GONORRHOEA DNA SPEC QL NAA+PROBE: POSITIVE

## 2019-10-10 ENCOUNTER — VBI (OUTPATIENT)
Dept: ADMINISTRATIVE | Facility: OTHER | Age: 24
End: 2019-10-10

## 2019-10-10 NOTE — LETTER
VALUE BASED VIR  [address] [facility] [place] [facility] [address]    Date: 10/10/19  [address] [place] [address]    Dear Samantha Lazo:    Records from [facility] indicate you were recently seen at [place] [address] Emergency Department  Please call the Kenna Amin at [phone] or [phone] (8:30am-4:30pm) to schedule a follow-up appointment and establish care with a [facility]'s Physician today  If you prefer, you can go to the website: [employer]  [email or web address] and click on the "Find a Doctor" link to search for a physician in your area  If you are seeing a primary care provider other than the one assigned to you by your insurance, you can simply call the number on the back of your insurance card to change your primary care physician with your insurance company  We thank you for choosing [facility] for your healthcare needs      Sincerely,  [facility]

## 2020-01-06 ENCOUNTER — HOSPITAL ENCOUNTER (EMERGENCY)
Facility: HOSPITAL | Age: 25
Discharge: HOME | End: 2020-01-06
Attending: EMERGENCY MEDICINE | Admitting: EMERGENCY MEDICINE
Payer: COMMERCIAL

## 2020-01-06 VITALS
OXYGEN SATURATION: 98 % | TEMPERATURE: 98.2 F | WEIGHT: 185.41 LBS | SYSTOLIC BLOOD PRESSURE: 137 MMHG | RESPIRATION RATE: 16 BRPM | HEART RATE: 66 BPM | DIASTOLIC BLOOD PRESSURE: 74 MMHG

## 2020-01-06 DIAGNOSIS — R68.89 FLU-LIKE SYMPTOMS: Primary | ICD-10-CM

## 2020-01-06 PROCEDURE — 99283 EMERGENCY DEPT VISIT LOW MDM: CPT

## 2020-01-06 PROCEDURE — 99283 EMERGENCY DEPT VISIT LOW MDM: CPT | Performed by: PHYSICIAN ASSISTANT

## 2020-01-06 RX ORDER — IBUPROFEN 400 MG/1
400 TABLET ORAL ONCE
Status: COMPLETED | OUTPATIENT
Start: 2020-01-06 | End: 2020-01-06

## 2020-01-06 RX ADMIN — IBUPROFEN 400 MG: 400 TABLET ORAL at 22:29

## 2020-01-07 ENCOUNTER — VBI (OUTPATIENT)
Dept: ADMINISTRATIVE | Facility: OTHER | Age: 25
End: 2020-01-07

## 2020-01-07 NOTE — ED PROVIDER NOTES
History  Chief Complaint   Patient presents with    Flu Symptoms     Patient presents complaining of bodyaches, cough, congestion, and loss of appetite-reports some vomiting  Denies abdominal pain  No flu shot this year  50-year-old male with no past medical history presents today complaining of body aches, nonproductive cough, congestion, sore throat, decreased appetite which began 2 days ago  He has not taken his temperature  He has not been taking anything for symptoms  He denies headache, neck pain, difficulty swallowing, chest pain, shortness of breath, abdominal pain, nausea, vomiting, diarrhea  No sick contacts  He did not get a flu vaccination  None       Past Medical History:   Diagnosis Date    Heart murmur        History reviewed  No pertinent surgical history  History reviewed  No pertinent family history  I have reviewed and agree with the history as documented  Social History     Tobacco Use    Smoking status: Current Every Day Smoker    Smokeless tobacco: Never Used   Substance Use Topics    Alcohol use: Yes     Comment: socially    Drug use: Yes     Types: Marijuana        Review of Systems   Constitutional: Positive for activity change and appetite change  HENT: Positive for congestion, postnasal drip and sore throat  Respiratory: Positive for cough  Musculoskeletal: Positive for myalgias  All other systems reviewed and are negative  Physical Exam  Physical Exam   Constitutional: He appears well-developed and well-nourished  No distress  HENT:   Head: Normocephalic and atraumatic  Right Ear: Tympanic membrane normal    Left Ear: Tympanic membrane normal    Mouth/Throat: Uvula is midline and mucous membranes are normal  Posterior oropharyngeal erythema present  Tonsils are 0 on the right  Tonsils are 0 on the left  Eyes: Pupils are equal, round, and reactive to light  Conjunctivae and EOM are normal    Neck: Normal range of motion  Neck supple  Cardiovascular: Normal rate, regular rhythm and normal heart sounds  Pulmonary/Chest: Effort normal and breath sounds normal  No stridor  No respiratory distress  He has no wheezes  Abdominal: Soft  Bowel sounds are normal  He exhibits no distension  There is no tenderness  There is no guarding  Musculoskeletal: Normal range of motion  Lymphadenopathy:     He has no cervical adenopathy  Neurological: He is alert  Skin: Skin is warm and dry  Capillary refill takes less than 2 seconds  No rash noted  He is not diaphoretic  Psychiatric: He has a normal mood and affect  His behavior is normal        Vital Signs  ED Triage Vitals [01/06/20 2056]   Temperature Pulse Respirations Blood Pressure SpO2   98 2 °F (36 8 °C) 66 16 137/74 98 %      Temp Source Heart Rate Source Patient Position - Orthostatic VS BP Location FiO2 (%)   Temporal Monitor Sitting Right arm --      Pain Score       6           Vitals:    01/06/20 2056   BP: 137/74   Pulse: 66   Patient Position - Orthostatic VS: Sitting         Visual Acuity      ED Medications  Medications   ibuprofen (MOTRIN) tablet 400 mg (has no administration in time range)       Diagnostic Studies  Results Reviewed     None                 No orders to display              Procedures  Procedures         ED Course                               MDM      Disposition  Final diagnoses:   Flu-like symptoms     Time reflects when diagnosis was documented in both MDM as applicable and the Disposition within this note     Time User Action Codes Description Comment    1/6/2020 10:23 PM Chapin Gonzalez Add [R63 89] Flu-like symptoms       ED Disposition     ED Disposition Condition Date/Time Comment    Discharge Stable Mon Jan 6, 2020 10:23 PM Lisbeth Gregg discharge to home/self care              Follow-up Information     Follow up With Specialties Details Why Contact Info Additional [address] Schedule an appointment as soon as possible for a visit   [address], [address] [facility], [address], [address] [place], [place], [address]          Patient's Medications   Discharge Prescriptions    DEXTROMETHORPHAN 15 MG/5ML SYRUP    Take 10 mL (30 mg total) by mouth 3 (three) times a day as needed for cough       Start Date: 1/6/2020  End Date: --       Order Dose: 30 mg       Quantity: 120 mL    Refills: 0     No discharge procedures on file      ED Provider  Electronically Signed by           Luis Noguera PA-C  01/06/20 1795

## 2020-03-23 ENCOUNTER — HOSPITAL ENCOUNTER (EMERGENCY)
Facility: HOSPITAL | Age: 25
Discharge: HOME | End: 2020-03-23
Attending: EMERGENCY MEDICINE | Admitting: EMERGENCY MEDICINE
Payer: COMMERCIAL

## 2020-03-23 VITALS
TEMPERATURE: 98.3 F | RESPIRATION RATE: 18 BRPM | WEIGHT: 187 LBS | OXYGEN SATURATION: 100 % | DIASTOLIC BLOOD PRESSURE: 84 MMHG | SYSTOLIC BLOOD PRESSURE: 138 MMHG | HEART RATE: 73 BPM

## 2020-03-23 DIAGNOSIS — Z00.8 MEDICAL CLEARANCE FOR INCARCERATION: Primary | ICD-10-CM

## 2020-03-23 PROCEDURE — 99283 EMERGENCY DEPT VISIT LOW MDM: CPT

## 2020-03-23 PROCEDURE — 99281 EMR DPT VST MAYX REQ PHY/QHP: CPT | Performed by: EMERGENCY MEDICINE
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
## 2025-06-19 ENCOUNTER — HOSPITAL ENCOUNTER (EMERGENCY)
Facility: HOSPITAL | Age: 30
Discharge: HOME | End: 2025-06-19
Attending: EMERGENCY MEDICINE

## 2025-06-19 VITALS
TEMPERATURE: 98.1 F | WEIGHT: 193.12 LBS | SYSTOLIC BLOOD PRESSURE: 164 MMHG | DIASTOLIC BLOOD PRESSURE: 91 MMHG | RESPIRATION RATE: 18 BRPM | OXYGEN SATURATION: 95 % | HEART RATE: 76 BPM
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
## 2025-06-19 PROCEDURE — 99281 EMR DPT VST MAYX REQ PHY/QHP: CPT

## 2025-06-19 PROCEDURE — 99284 EMERGENCY DEPT VISIT MOD MDM: CPT

## 2025-06-19 RX ORDER — DOXYCYCLINE 100 MG/1
200 CAPSULE ORAL ONCE
Status: COMPLETED | OUTPATIENT
Start: 2025-06-19 | End: 2025-06-19

## 2025-06-19 RX ADMIN — DOXYCYCLINE HYCLATE 200 MG: 100 CAPSULE ORAL at 22:42

## 2025-06-20 NOTE — ED PROVIDER NOTES
Time reflects when diagnosis was documented in both MDM as applicable and the Disposition within this note       Time User Action Codes Description Comment    6/19/2025 10:36 PM Matheus Edwards Add [W57.XXXA] Tick bite           ED Disposition       ED Disposition   Discharge    Condition   Stable    Date/Time   Thu Jun 19, 2025 10:36 PM    Comment   Louie Puckett discharge to home/self care.                   Assessment & Plan       Medical Decision Making  29-year-old male presents after removing tick from his left forearm yesterday.  On exam he is well-appearing and in NAD.  Insect bite to medial left forearm.  No significant surrounding erythema, induration, tenderness, fluctuance, warmth.  No drainage.    Patient presenting s/p tick removal, possible remnants of insect embedded in skin.  Although tick was likely not attached for 72 hours and was not engorged, if incomplete removal we will plan to treat prophylactically for Lyme with 200 mg dose of doxycycline x 1.  Educated patient on plan.  Discussed return precautions such as myalgias, headaches, fevers, rash.  Patient expresses understanding of the condition, treatment plan, follow-up instructions, and return precautions.      Risk  Prescription drug management.             Medications   doxycycline hyclate (VIBRAMYCIN) capsule 200 mg (200 mg Oral Given 6/19/25 2242)       ED Risk Strat Scores                    No data recorded                            History of Present Illness       Chief Complaint   Patient presents with    Tick Bite     Patient states he was bitten by a tick 2 days ago. Tick was removed but he still had a red dot on his left arm.       Past Medical History[1]   Past Surgical History[2]   Family History[3]   Social History[4]   E-Cigarette/Vaping    E-Cigarette Use Never User       E-Cigarette/Vaping Substances      I have reviewed and agree with the history as documented.     29-year-old male presenting after removing a tick embedded in  his left forearm 2 days ago.  He presents a picture of the tick when it was embedded, does not appear to be engorged.  Attempted removal made by his coworker, he thinks there is still some insect material in his arm.  There is no surrounding skin changes.  Has no other concerns.        Review of Systems   Constitutional:  Negative for chills and fever.   HENT:  Negative for ear pain and sore throat.    Eyes:  Negative for pain and visual disturbance.   Respiratory:  Negative for cough and shortness of breath.    Cardiovascular:  Negative for chest pain and palpitations.   Gastrointestinal:  Negative for abdominal pain and vomiting.   Genitourinary:  Negative for dysuria and hematuria.   Musculoskeletal:  Negative for arthralgias and back pain.   Skin:  Positive for wound. Negative for color change and rash.   Neurological:  Negative for seizures and syncope.   All other systems reviewed and are negative.          Objective       ED Triage Vitals [06/19/25 2213]   Temperature Pulse Blood Pressure Respirations SpO2 Patient Position - Orthostatic VS   98.1 °F (36.7 °C) 76 164/91 18 95 % Sitting      Temp Source Heart Rate Source BP Location FiO2 (%) Pain Score    Oral Monitor Right arm -- --      Vitals      Date and Time Temp Pulse SpO2 Resp BP Pain Score FACES Pain Rating User   06/19/25 2213 98.1 °F (36.7 °C) 76 95 % 18 164/91 -- -- [facility]            Physical Exam  Vitals and nursing note reviewed.   Constitutional:       General: He is not in acute distress.     Appearance: He is well-developed.   HENT:      Head: Normocephalic and atraumatic.     Eyes:      Conjunctiva/sclera: Conjunctivae normal.       Cardiovascular:      Rate and Rhythm: Normal rate and regular rhythm.      Heart sounds: No murmur heard.  Pulmonary:      Effort: Pulmonary effort is normal. No respiratory distress.      Breath sounds: Normal breath sounds.   Abdominal:      Palpations: Abdomen is soft.      Tenderness: There is no abdominal  tenderness.     Musculoskeletal:         General: No swelling.      Cervical back: Neck supple.     Skin:     General: Skin is warm and dry.      Capillary Refill: Capillary refill takes less than 2 seconds.          Neurological:      Mental Status: He is alert.     Psychiatric:         Mood and Affect: Mood normal.         Results Reviewed       None            No orders to display       Procedures    ED Medication and Procedure Management   Prior to Admission Medications   Prescriptions Last Dose Informant Patient Reported? Taking?   dextromethorphan 15 MG/5ML syrup   No No   Sig: Take 10 mL (30 mg total) by mouth 3 (three) times a day as needed for cough      Facility-Administered Medications: None     Discharge Medication List as of 6/19/2025 10:40 PM        CONTINUE these medications which have NOT CHANGED    Details   dextromethorphan 15 MG/5ML syrup Take 10 mL (30 mg total) by mouth 3 (three) times a day as needed for cough, Starting Mon 1/6/2020, Print           No discharge procedures on file.  ED SEPSIS DOCUMENTATION   Time reflects when diagnosis was documented in both MDM as applicable and the Disposition within this note       Time User Action Codes Description Comment    6/19/2025 10:36 PM Matheus Edwards Add [W57.XXXA] Tick bite                      [1]   Past Medical History:  Diagnosis Date    Heart murmur    [2] No past surgical history on file.  [3] No family history on file.  [4]   Social History  Tobacco Use    Smoking status: Every Day    Smokeless tobacco: Never   Vaping Use    Vaping status: Never Used   Substance Use Topics    Alcohol use: Yes     Comment: socially    Drug use: Yes     Types: Marijuana        Matheus Edwards PA-C  06/20/25 0548

## 2025-06-20 NOTE — DISCHARGE INSTRUCTIONS
As we discussed, single dose of doxycycline generally prevents against Lyme disease. Please follow up if developing severe body aches, fatigue, headaches, rash, etc.